# Patient Record
Sex: MALE | Race: WHITE | NOT HISPANIC OR LATINO
[De-identification: names, ages, dates, MRNs, and addresses within clinical notes are randomized per-mention and may not be internally consistent; named-entity substitution may affect disease eponyms.]

---

## 2023-07-31 ENCOUNTER — APPOINTMENT (OUTPATIENT)
Dept: PULMONOLOGY | Facility: CLINIC | Age: 78
End: 2023-07-31
Payer: MEDICARE

## 2023-07-31 VITALS — WEIGHT: 190 LBS | BODY MASS INDEX: 30.53 KG/M2 | HEIGHT: 66 IN

## 2023-07-31 DIAGNOSIS — Z78.9 OTHER SPECIFIED HEALTH STATUS: ICD-10-CM

## 2023-07-31 DIAGNOSIS — Z87.891 PERSONAL HISTORY OF NICOTINE DEPENDENCE: ICD-10-CM

## 2023-07-31 DIAGNOSIS — J20.9 CHRONIC OBSTRUCTIVE PULMONARY DISEASE W/ (ACUTE) LWR RESPIRATORY INFECTION: ICD-10-CM

## 2023-07-31 DIAGNOSIS — G47.19 OTHER HYPERSOMNIA: ICD-10-CM

## 2023-07-31 DIAGNOSIS — R06.83 SNORING: ICD-10-CM

## 2023-07-31 DIAGNOSIS — J44.0 CHRONIC OBSTRUCTIVE PULMONARY DISEASE W/ (ACUTE) LWR RESPIRATORY INFECTION: ICD-10-CM

## 2023-07-31 PROBLEM — Z00.00 ENCOUNTER FOR PREVENTIVE HEALTH EXAMINATION: Status: ACTIVE | Noted: 2023-07-31

## 2023-07-31 PROCEDURE — 99203 OFFICE O/P NEW LOW 30 MIN: CPT

## 2023-07-31 RX ORDER — UMECLIDINIUM BROMIDE AND VILANTEROL TRIFENATATE 62.5; 25 UG/1; UG/1
POWDER RESPIRATORY (INHALATION)
Refills: 0 | Status: ACTIVE | COMMUNITY

## 2023-07-31 RX ORDER — TAMSULOSIN HYDROCHLORIDE 0.4 MG/1
0.4 CAPSULE ORAL
Refills: 0 | Status: ACTIVE | COMMUNITY

## 2023-07-31 NOTE — PHYSICAL EXAM
[General Appearance - Well Nourished] : well nourished [General Appearance - Well Developed] : well developed [Elongated Uvula] : elongated uvula [Enlarged Base of the Tongue] : enlargement of the base of the tongue [III] : III [Heart Sounds] : normal S1 and S2 [Murmurs] : no murmurs [] : no respiratory distress [Auscultation Breath Sounds / Voice Sounds] : lungs were clear to auscultation bilaterally [FreeTextEntry1] : no edema [No Focal Deficits] : no focal deficits [Oriented To Time, Place, And Person] : oriented to person, place, and time [Memory Recent] : recent memory was not impaired

## 2023-07-31 NOTE — HISTORY OF PRESENT ILLNESS
[FreeTextEntry1] : Dr. Rosen, [facility] 78 year old man  with history of copd is here in the sleep center to address excessive snoring and excessive daytime sleepiness.  Patient is sleepy with [place] sleepiness score of 14.  Patient has very loud snoring which disturbs his  wife, also has witnessed apneas.  Patient's bedtime is around 10 PM wakes up in the morning around 6 AM.  He  feels tired when he  wakes up.  Patient drinks 2 cups of coffee during the daytime. Patient does not have any headaches, has nocturia 2-3 times.  He  is sleepy while driving some times. STOPBANG score - 4 neck size - 17 and half inches  Patient is scheduled to get ct chest, I asked him to forward me the results.

## 2023-07-31 NOTE — ASSESSMENT
[FreeTextEntry1] : Patient has high stob bang score with significant symptoms of sleep apnea. Discussed about cpap therapy with the patient. WIll do a watch pat study and order cpap after the study.

## 2024-03-14 ENCOUNTER — APPOINTMENT (OUTPATIENT)
Dept: PULMONOLOGY | Facility: CLINIC | Age: 79
End: 2024-03-14
Payer: MEDICARE

## 2024-03-14 VITALS
HEIGHT: 66 IN | DIASTOLIC BLOOD PRESSURE: 70 MMHG | HEART RATE: 75 BPM | BODY MASS INDEX: 30.53 KG/M2 | WEIGHT: 190 LBS | SYSTOLIC BLOOD PRESSURE: 130 MMHG

## 2024-03-14 DIAGNOSIS — G47.33 OBSTRUCTIVE SLEEP APNEA (ADULT) (PEDIATRIC): ICD-10-CM

## 2024-03-14 PROCEDURE — 99213 OFFICE O/P EST LOW 20 MIN: CPT

## 2024-03-14 RX ORDER — ATORVASTATIN CALCIUM 80 MG/1
TABLET, FILM COATED ORAL
Refills: 0 | Status: ACTIVE | COMMUNITY

## 2024-03-14 RX ORDER — ASPIRIN 325 MG/1
TABLET, FILM COATED ORAL
Refills: 0 | Status: ACTIVE | COMMUNITY

## 2024-03-14 NOTE — HISTORY OF PRESENT ILLNESS
[Date: ___] : Date of most recent diagnostic polysomnogram: [unfilled] [AHI: ___ per hour] : Apnea-hypopnea index:  [unfilled] per hour [T90%: ___] : T90%: [unfilled]% [Naveed desatuation%: ___] : Naveed desaturation:  [unfilled]% [% Days used: ____] : Days used: [unfilled] % [% Days used > 4 hrs: ____] : Days used > 4 hrs: [unfilled] % [Mean nightly usage: ___ hrs] : Mean nightly usage: [unfilled] hrs [Therapy based AHI: ___ /hr] : Therapy based AHI: [unfilled] / hr [FreeTextEntry1] : Dr. Rosen, [facility] 78 year old man  with history of copd is here in the sleep center to address sleep apnea. symptoms prior to cpap -  Patient is sleepy with [place] sleepiness score of 14.  Patient has very loud snoring which disturbs his  wife, also has witnessed apneas.  Patient's bedtime is around 10 PM wakes up in the morning around 6 AM.  He  feels tired when he  wakes up.  Patient drinks 2 cups of coffee during the daytime. Patient does not have any headaches, has nocturia 2-3 times.  He  is sleepy while driving some times. STOPBANG score - 4 neck size - 17 and half inches  symptoms on the cpap - Patient is not sleepy with [place] sleepiness score of 4.  Patients snoring and witnessed apneas improved with cpap.  Patient's bedtime is around 10 PM wakes up in the morning around 7.30 AM.  He  feels rested when he  wakes up.  Patient drinks 2 cups of coffee during the daytime. Patient does not have any headaches, has nocturia 2-3 times.  He  is not sleepy while driving.  on resmed device uses nasal pillows ahi 0.7 pressure 5-15 cm usage 6 hrs

## 2024-04-05 ENCOUNTER — TRANSCRIPTION ENCOUNTER (OUTPATIENT)
Age: 79
End: 2024-04-05

## 2024-06-16 ENCOUNTER — TRANSCRIPTION ENCOUNTER (OUTPATIENT)
Age: 79
End: 2024-06-16

## 2024-12-25 PROBLEM — F10.90 ALCOHOL USE: Status: ACTIVE | Noted: 2023-07-31

## 2025-05-02 ENCOUNTER — TRANSCRIPTION ENCOUNTER (OUTPATIENT)
Age: 80
End: 2025-05-02

## 2025-06-12 ENCOUNTER — RESULT REVIEW (OUTPATIENT)
Age: 80
End: 2025-06-12

## 2025-06-12 ENCOUNTER — APPOINTMENT (OUTPATIENT)
Facility: CLINIC | Age: 80
End: 2025-06-12

## 2025-06-12 VITALS
SYSTOLIC BLOOD PRESSURE: 128 MMHG | DIASTOLIC BLOOD PRESSURE: 73 MMHG | BODY MASS INDEX: 33.29 KG/M2 | HEART RATE: 92 BPM | OXYGEN SATURATION: 95 % | WEIGHT: 195 LBS | HEIGHT: 64 IN

## 2025-06-12 PROCEDURE — 99203 OFFICE O/P NEW LOW 30 MIN: CPT
